# Patient Record
(demographics unavailable — no encounter records)

---

## 2025-02-07 NOTE — HISTORY OF PRESENT ILLNESS
[Home] : at home, [unfilled] , at the time of the visit. [Medical Office: ([facility])___] : at the medical office located in  [Verbal consent obtained from patient] : the patient, [unfilled] [Telehealth (audio & video)] : This visit was provided via telehealth using real-time 2-way audio visual technology. [de-identified] : Patient calls office concerned about starting medication, Zepbound, prescribed at [facility] for progressive increasing weight gain and more importantly for always feeling hungry.  Since taking the first injection patient is feeling much better with no longer having the severe cravings of hunger. No abdominal pain or discomfort, no acid reflux, no constipation issues.